# Patient Record
Sex: FEMALE | Race: WHITE | Employment: UNEMPLOYED | URBAN - METROPOLITAN AREA
[De-identification: names, ages, dates, MRNs, and addresses within clinical notes are randomized per-mention and may not be internally consistent; named-entity substitution may affect disease eponyms.]

---

## 2020-02-10 PROBLEM — D68.59 HYPERCOAGULABLE STATE (HCC): Status: ACTIVE | Noted: 2018-10-24

## 2020-02-10 PROBLEM — F33.2 SEVERE EPISODE OF RECURRENT MAJOR DEPRESSIVE DISORDER, WITHOUT PSYCHOTIC FEATURES (HCC): Status: RESOLVED | Noted: 2018-05-10 | Resolved: 2020-02-10

## 2020-02-10 PROBLEM — F33.2 SEVERE EPISODE OF RECURRENT MAJOR DEPRESSIVE DISORDER, WITHOUT PSYCHOTIC FEATURES (HCC): Status: ACTIVE | Noted: 2018-05-10

## 2020-02-10 PROBLEM — F50.9 EATING DISORDER: Status: ACTIVE | Noted: 2018-05-10

## 2020-02-10 PROBLEM — Z86.19 HISTORY OF E. COLI SEPTICEMIA: Status: ACTIVE | Noted: 2020-02-10

## 2020-02-12 PROBLEM — F41.1 GAD (GENERALIZED ANXIETY DISORDER): Status: ACTIVE | Noted: 2020-02-12

## 2020-02-12 PROBLEM — Z86.59 H/O MAJOR DEPRESSION: Status: ACTIVE | Noted: 2020-02-12

## 2020-02-12 PROBLEM — R53.82 CHRONIC FATIGUE: Status: ACTIVE | Noted: 2020-02-12

## 2020-02-12 PROBLEM — F51.04 PSYCHOPHYSIOLOGICAL INSOMNIA: Status: ACTIVE | Noted: 2020-02-12

## 2021-03-16 ENCOUNTER — TRANSCRIBE ORDER (OUTPATIENT)
Dept: SCHEDULING | Age: 41
End: 2021-03-16

## 2021-03-16 DIAGNOSIS — Z12.31 ENCOUNTER FOR SCREENING MAMMOGRAM FOR MALIGNANT NEOPLASM OF BREAST: Primary | ICD-10-CM

## 2021-03-17 ENCOUNTER — TRANSCRIBE ORDER (OUTPATIENT)
Dept: SCHEDULING | Age: 41
End: 2021-03-17

## 2021-03-17 DIAGNOSIS — Z12.31 SCREENING MAMMOGRAM FOR HIGH-RISK PATIENT: Primary | ICD-10-CM

## 2021-03-22 ENCOUNTER — HOSPITAL ENCOUNTER (OUTPATIENT)
Dept: MAMMOGRAPHY | Age: 41
Discharge: HOME | End: 2021-03-22
Payer: COMMERCIAL

## 2021-03-22 DIAGNOSIS — Z12.31 SCREENING MAMMOGRAM FOR HIGH-RISK PATIENT: ICD-10-CM

## 2021-03-22 PROCEDURE — 77063 BREAST TOMOSYNTHESIS BI: CPT

## 2021-06-10 PROBLEM — M79.605 LEFT LEG PAIN: Status: ACTIVE | Noted: 2021-06-10

## 2022-03-18 PROBLEM — Z86.59 H/O MAJOR DEPRESSION: Status: ACTIVE | Noted: 2020-02-12

## 2022-03-18 PROBLEM — Z86.19 HISTORY OF E. COLI SEPTICEMIA: Status: ACTIVE | Noted: 2020-02-10

## 2022-03-19 PROBLEM — M79.605 LEFT LEG PAIN: Status: ACTIVE | Noted: 2021-06-10

## 2022-03-19 PROBLEM — F41.1 GAD (GENERALIZED ANXIETY DISORDER): Status: ACTIVE | Noted: 2020-02-12

## 2022-03-19 PROBLEM — F50.9 EATING DISORDER: Status: ACTIVE | Noted: 2018-05-10

## 2022-03-19 PROBLEM — R53.82 CHRONIC FATIGUE: Status: ACTIVE | Noted: 2020-02-12

## 2022-03-19 PROBLEM — F51.04 PSYCHOPHYSIOLOGICAL INSOMNIA: Status: ACTIVE | Noted: 2020-02-12

## 2022-03-20 PROBLEM — D68.59 HYPERCOAGULABLE STATE (HCC): Status: ACTIVE | Noted: 2018-10-24

## 2022-08-22 ENCOUNTER — HOSPITAL ENCOUNTER (OUTPATIENT)
Dept: MAMMOGRAPHY | Age: 42
Discharge: HOME | End: 2022-08-25

## 2022-08-22 DIAGNOSIS — Z12.31 SCREENING MAMMOGRAM FOR HIGH-RISK PATIENT: ICD-10-CM

## 2025-07-21 ENCOUNTER — OFFICE VISIT (OUTPATIENT)
Dept: ORTHOPEDIC SURGERY | Age: 45
End: 2025-07-21

## 2025-07-21 DIAGNOSIS — S76.312A PARTIAL TEAR OF LEFT HAMSTRING: ICD-10-CM

## 2025-07-21 DIAGNOSIS — M76.899 HAMSTRING TENDINITIS AT ORIGIN: Primary | ICD-10-CM

## 2025-07-21 PROCEDURE — 99204 OFFICE O/P NEW MOD 45 MIN: CPT | Performed by: STUDENT IN AN ORGANIZED HEALTH CARE EDUCATION/TRAINING PROGRAM

## 2025-07-21 NOTE — PROGRESS NOTES
Name: Marybeth Thacker  YOB: 1980  Gender: female  MRN: [id number]  Date of Encounter:  7/21/2025       CHIEF COMPLAINT:     Chief Complaint   Patient presents with    Hip Pain     Hamstring        SUBJECTIVE/OBJECTIVE:      HPI:    Marybeth Thacker  is a 44 y.o. pleasant female who presents today for a new evaluation of her left hip     Marybeth Thacker  has a past medical history of Prothrombin gene mutation.     History of Present Illness  The patient presents for evaluation of hip arthralgia.    Approximately 8 to 9 years ago, she experienced an acute onset of pain following a week of intense physical activity, including sprinting and deadlifting. The pain commenced while she was bending over to retrieve laundry from the dryer, resulting in severe discomfort that prevented her from standing. The pain radiated from the gluteal region to the lower extremity. She previously saw 2 partners in my practice Dr. Lester Rodriguez and Chin Jean-Baptiste for this left leg pain in 2021.  Prior to that visit the pain had been going on for 5 to 6 years and she believes it started after doing sprints and dead lifting.  An MRI was ordered at that visit but she declined the procedure due to financial constraints and her belief that the issue was not spinal in origin. She does note that at the time she had a lot of pain to the coccyx and SI joint region too.     Her condition has remained unchanged since the initial onset. She has pursued acupuncture and stretching exercises, which provided transient relief but failed to resolve the underlying issue. Her daily activities are significantly impaired, including her ability to sit through a movie or engage in running. Even minor activities, such as performing body weight good mornings, can exacerbate the pain. She reports difficulty in operating the recliner mechanism of her chair and finds walking uphill particularly challenging. She has not sought professional physical